# Patient Record
(demographics unavailable — no encounter records)

---

## 2025-03-03 NOTE — DATA REVIEWED
[de-identified] :  Type A tymps, AU. Testing via inserts: Moderate sloping to severe SNHL, AU. Recs: 1) F/u w/ MD 2) Continue wearing hearing aids 3) Preferential classroom seating  4) Continue use of hearing aid accessory in school 5) Continue special services 6) Annual

## 2025-03-03 NOTE — ASSESSMENT
[FreeTextEntry1] : cerumen cleared  audio moderate to severe sn loss doing well w aids f/u w hearing aid audiologist f/u 6 mo

## 2025-03-03 NOTE — REASON FOR VISIT
[Subsequent Evaluation] : a subsequent evaluation for [Parent] : parent [Language Line ] : provided by Language Line   [Time Spent: ____ minutes] : Total time spent using  services: [unfilled] minutes. The patient's primary language is not English thus required  services. [FreeTextEntry2] : ear check and hearing evaluation  [Interpreters_IDNumber] : [id number] [Interpreters_FullName] : Valeriano [TWNoteComboBox1] : Tanzanian

## 2025-05-07 NOTE — HISTORY OF PRESENT ILLNESS
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 with left lateral ventricular dilation, 1cm midline shift; pt vital signs showed bradycardia and hypotension c/o ICP; stat craniostomy for endoscopic septostomy and EVD insertion placed on 4/23/25, admitted to PICU for neuro check and ICP monitoring- started on ancef; pt throat cx from urgent care positive for group A strep; endocrine consulted due to c/o DI and SIADH;  left craniotomy with tumor resection completed on 4/25/25; post op pt have hypernatremia and increase UO, consistent with DI, started on vasopressin;  hydrocortisone 10mg BID  and fludrocortisone daily; pt had emergent CT scan on 4/27/25 due headache/neck pain but no changes were seen on imaging,  ICP was monitored- pt hyponatremia and DI resolved, EVD removed by neurosurgery prior to transfer to peds floor. Clinically pt was stable on floor- no headaches, no vomiting or shortness of breath, cleared for d/c home by neurosurgery;  Pt currently doing well, no headaches, no n/v, eating well- 3meals daily, drinking well, voiding 6times daily. Per mom pt was d/sierra on fludrocortisone daily and hydrocortisone BID however per mom at pharmacy she was told fludrocortisone had been cancelled and pt was only to take hydrocortisone tablets.  reviewed 5/5/25 d/c notes from [facility] including labwork and imaging studies.  apt: radiation oncology 5/13/25 neurosurgery Dr Gottlieb 5/27/25 endocrinology Dr. Guerra 5/8/25   ophthamology- no apt made yet

## 2025-05-07 NOTE — DISCUSSION/SUMMARY
[FreeTextEntry1] : D/W parent via - continue current management; continue medication as prescribed at discharge from [facility]; continue supportive care; 8hrs of sleep nightly; monitor for vomiting, persistent headache, night time waking with headache and call if occurring for recheck; f/u with specialist as planned; will place referral to ophthalmologist today; Pt requests assistance with transportation to apt- [facility]. Spoke with [facility] peds resident to confirm d/c medications- pt is to take fludrocortisone and hydrocortisone as prescribed- parent to review steroid dosage with endocrinology at apt tomorrow- message left for mom by Hyacinth VENTURA to reconfirm medications. Pt is overdue for PE, mom aware make apt.  addendum: Hyacinth VENTURA spoke to mom- aware to continue both fludrocortisone and hydrocortisone as prescribed, f/u with endocrinology tomorrow. EB time spent in chart review, face to face, coordination of care, documentation: 75min

## 2025-05-07 NOTE — REVIEW OF SYSTEMS
[Fever] : no fever [Headache] : no headache [Appetite Changes] : no appetite changes [Vomiting] : no vomiting

## 2025-05-07 NOTE — PHYSICAL EXAM
[NL] : moves all extremities x4, warm, well perfused x4 [FreeTextEntry3] : hearing aids in place [de-identified] : CN 2-12 intact, 5/5 MS, sensation intact, normal gait, balance intact, cerebellar signs intact; pt walking with assistance from parent [de-identified] : healing linear well approximated surgical incision to left parietal scalp.

## 2025-06-11 NOTE — RISK ASSESSMENT
[Medical reason not done] : Medical reason not done [Have you ever fainted, passed out or had an unexplained seizure suddenly and without warning, especially during exercise or in response] : Have you ever fainted, passed out or had an unexplained seizure suddenly and without warning, especially during exercise or in response to sudden loud noises such as doorbells, alarm clocks and ringing telephones? No [Have you ever had exercise-related chest pain or shortness of breath?] : Have you ever had exercise-related chest pain or shortness of breath? No [Has anyone in your immediate family (parents, grandparents, siblings) or other more distant relatives (aunts, uncles, cousins)  of heart] : Has anyone in your immediate family (parents, grandparents, siblings) or other more distant relatives (aunts, uncles, cousins)  of heart problems or had an unexpected sudden death before age 50 (This would include unexpected drownings, unexplained car accidents in which the relative was driving or sudden infant death syndrome.)? No [Are you related to anyone with hypertrophic cardiomyopathy or hypertrophic obstructive cardiomyopathy, Marfan syndrome, arrhythmogenic] : Are you related to anyone with hypertrophic cardiomyopathy or hypertrophic obstructive cardiomyopathy, Marfan syndrome, arrhythmogenic right ventricular cardiomyopathy, long QT syndrome, short QT syndrome, Brugada syndrome or catecholaminergic polymorphic ventricular tachycardia, or anyone younger than 50 years with a pacemaker or implantable defibrillator? No [No Increased risk of SCA or SCD] : No Increased risk of SCA or SCD

## 2025-06-11 NOTE — HISTORY OF PRESENT ILLNESS
[Mother] : mother [Yes] : Patient goes to dentist yearly [Vitamin] : Primary Fluoride Source: Vitamin [Up to date] : Up to date [Eats meals with family] : eats meals with family [Has family members/adults to turn to for help] : has family members/adults to turn to for help [Is permitted and is able to make independent decisions] : Is permitted and is able to make independent decisions [Sleep Concerns] : no sleep concerns [Eats regular meals including adequate fruits and vegetables] : eats regular meals including adequate fruits and vegetables [Drinks non-sweetened liquids] : drinks non-sweetened liquids  [Calcium source] : calcium source [Has concerns about body or appearance] : does not have concerns about body or appearance [Has friends] : has friends [At least 1 hour of physical activity a day] : at least 1 hour of physical activity a day [Screen time (except homework) less than 2 hours a day] : screen time (except homework) less than 2 hours a day [Has interests/participates in community activities/volunteers] : has interests/participates in community activities/volunteers. [Uses electronic nicotine delivery system] : does not use electronic nicotine delivery system [Exposure to electronic nicotine delivery system] : no exposure to electronic nicotine delivery system [Uses tobacco] : does not use tobacco [Exposure to tobacco] : no exposure to tobacco [Uses drugs] : does not use drugs  [Exposure to drugs] : no exposure to drugs [Drinks alcohol] : does not drink alcohol [Exposure to alcohol] : no exposure to alcohol [Uses safety belts/safety equipment] : uses safety belts/safety equipment  [Impaired/distracted driving] : no impaired/distracted driving [Has peer relationships free of violence] : has peer relationships free of violence [No] : Patient has not had sexual intercourse [HIV Screening Declined] : HIV Screening Declined [Has ways to cope with stress] : has ways to cope with stress [Displays self-confidence] : displays self-confidence [Has problems with sleep] : has problems with sleep [Gets depressed, anxious, or irritable/has mood swings] : does not get depressed, anxious, or irritable/has mood swings [Has thought about hurting self or considered suicide] : has not thought about hurting self or considered suicide [FreeTextEntry7] : 12yo [facility] [de-identified] : home schooled right now due to health status [FreeTextEntry1] : Endocrinology- follows every 2 weeks. On desmopressin, Synthroid and hydrocortisone. Asymptomatic.  Neurosurgery- following closely, deciding along with oncology if radiotherapy is necessary. Not cleared to go back to school or for physical activity.  ENT- needs follow up

## 2025-06-11 NOTE — DISCUSSION/SUMMARY
[Normal Growth] : growth [Normal Development] : development  [No Elimination Concerns] : elimination [Continue Regimen] : feeding [No Skin Concerns] : skin [Normal Sleep Pattern] : sleep [None] : no medical problems [Anticipatory Guidance Given] : Anticipatory guidance addressed as per the history of present illness section [Physical Growth and Development] : physical growth and development [Social and Academic Competence] : social and academic competence [Emotional Well-Being] : emotional well-being [Risk Reduction] : risk reduction [Violence and Injury Prevention] : violence and injury prevention [No Vaccines] : no vaccines needed [No Medications] : ~He/She~ is not on any medications [Patient] : patient [Parent/Guardian] : Parent/Guardian [Not cleared] : Not cleared [Pending further evaluation: ___] : - pending further evaluation: [unfilled] [FreeTextEntry1] : Continue balanced diet with all food groups. Brush teeth twice a day with toothbrush. Recommend visit to dentist. Maintain consistent daily routines and sleep schedule. Personal hygiene, puberty, and sexual health reviewed. Risky behaviors assessed. School discussed. Limit screen time to no more than 2 hours per day. Encourage physical activity. Cardiac questionnaire reviewed, NO issues. 5-2-1-0 questionnaire reviewed and I discussed components of 5-2-1-0 healthy living with patient and family.  Recommended 5 servings of fruits and vegetables a day, less than 2 hours of screen time per day, 1 hour of exercise per day and zero sugar sweetened beverages. Parent(s) have no issues or concerns. Discussed in the preferred language of English Return 1 year for routine well child check.

## 2025-06-11 NOTE — PHYSICAL EXAM
[Alert] : alert [No Acute Distress] : no acute distress [Normocephalic] : normocephalic [EOMI Bilateral] : EOMI bilateral [Clear tympanic membranes with bony landmarks and light reflex present bilaterally] : clear tympanic membranes with bony landmarks and light reflex present bilaterally  [Pink Nasal Mucosa] : pink nasal mucosa [Nonerythematous Oropharynx] : nonerythematous oropharynx [Supple, full passive range of motion] : supple, full passive range of motion [No Palpable Masses] : no palpable masses [Clear to Auscultation Bilaterally] : clear to auscultation bilaterally [Regular Rate and Rhythm] : regular rate and rhythm [Normal S1, S2 audible] : normal S1, S2 audible [No Murmurs] : no murmurs [+2 Femoral Pulses] : +2 femoral pulses [Soft] : soft [NonTender] : non tender [Non Distended] : non distended [Normoactive Bowel Sounds] : normoactive bowel sounds [No Hepatomegaly] : no hepatomegaly [No Splenomegaly] : no splenomegaly [No Abnormal Lymph Nodes Palpated] : no abnormal lymph nodes palpated [No pain or deformities with palpation of bone, muscles, joints] : no pain or deformities with palpation of bone, muscles, joints [No Rash or Lesions] : no rash or lesions